# Patient Record
Sex: FEMALE | Race: WHITE | NOT HISPANIC OR LATINO
[De-identification: names, ages, dates, MRNs, and addresses within clinical notes are randomized per-mention and may not be internally consistent; named-entity substitution may affect disease eponyms.]

---

## 2019-03-22 ENCOUNTER — RESULT REVIEW (OUTPATIENT)
Age: 39
End: 2019-03-22

## 2019-11-19 PROBLEM — Z00.00 ENCOUNTER FOR PREVENTIVE HEALTH EXAMINATION: Status: ACTIVE | Noted: 2019-11-19

## 2019-11-21 ENCOUNTER — APPOINTMENT (OUTPATIENT)
Dept: CARDIOLOGY | Facility: CLINIC | Age: 39
End: 2019-11-21

## 2021-06-08 ENCOUNTER — APPOINTMENT (OUTPATIENT)
Dept: SURGERY | Facility: CLINIC | Age: 41
End: 2021-06-08
Payer: COMMERCIAL

## 2021-06-08 VITALS — BODY MASS INDEX: 39.27 KG/M2 | HEIGHT: 60 IN | WEIGHT: 200 LBS

## 2021-06-08 PROCEDURE — 99072 ADDL SUPL MATRL&STAF TM PHE: CPT

## 2021-06-08 PROCEDURE — 99204 OFFICE O/P NEW MOD 45 MIN: CPT

## 2021-06-08 RX ORDER — ETONOGESTREL AND ETHINYL ESTRADIOL .12; .015 MG/D; MG/D
0.12-0.015 INSERT, EXTENDED RELEASE VAGINAL
Refills: 0 | Status: ACTIVE | COMMUNITY

## 2021-06-08 NOTE — HISTORY OF PRESENT ILLNESS
[de-identified] : The patient is referred by Dr Anderson for evaluation and discussion regarding chronic biliary colic with gallstones and more recent intensification of symptoms. She has a 20 year h/o biliary colic for which she has mostly tolerated by avoiding fattyt greasy foods or by tolerating the pains subsequent to such meals. Recently the episodes have been more intense., lasting longer. \par She denies change in urine or stool color. She had a C/S and no other abdominal surgery. She is overweight.

## 2021-06-08 NOTE — CONSULT LETTER
[Dear  ___] : Dear  [unfilled], [Consult Letter:] : I had the pleasure of evaluating your patient, [unfilled]. [Please see my note below.] : Please see my note below. [FreeTextEntry1] : Luis Eduardo Harris- not sure what she is waiting for--long time with symptoms! She will schedule the surgery soon. Thanks--Cristobal [DrDianna  ___] : Dr. HURTADO

## 2021-06-08 NOTE — ASSESSMENT
[FreeTextEntry1] : biliary colic, crescendoing of symptoms, gallstones, probable chronic cholecystitis, \par discussed options, lap cholecystectomy recommended\par \par The risks benefits and alternatives were discussed and the patient agrees to the described plan.\par

## 2021-06-08 NOTE — PHYSICAL EXAM
[Normal Breath Sounds] : Normal breath sounds [Normal Heart Sounds] : normal heart sounds [No Rash or Lesion] : No rash or lesion [Alert] : alert [Oriented to Person] : oriented to person [Oriented to Place] : oriented to place [Oriented to Time] : oriented to time [Calm] : calm [de-identified] : NAD [de-identified] : benign, obese abd

## 2021-07-16 ENCOUNTER — APPOINTMENT (OUTPATIENT)
Dept: SURGERY | Facility: HOSPITAL | Age: 41
End: 2021-07-16

## 2021-07-23 ENCOUNTER — NON-APPOINTMENT (OUTPATIENT)
Age: 41
End: 2021-07-23

## 2021-07-25 ENCOUNTER — NON-APPOINTMENT (OUTPATIENT)
Age: 41
End: 2021-07-25

## 2021-07-29 ENCOUNTER — APPOINTMENT (OUTPATIENT)
Dept: SURGERY | Facility: CLINIC | Age: 41
End: 2021-07-29
Payer: COMMERCIAL

## 2021-07-29 VITALS
SYSTOLIC BLOOD PRESSURE: 134 MMHG | HEART RATE: 78 BPM | TEMPERATURE: 97.5 F | DIASTOLIC BLOOD PRESSURE: 78 MMHG | HEIGHT: 60 IN | WEIGHT: 200 LBS | BODY MASS INDEX: 39.27 KG/M2

## 2021-07-29 DIAGNOSIS — Z09 ENCOUNTER FOR FOLLOW-UP EXAMINATION AFTER COMPLETED TREATMENT FOR CONDITIONS OTHER THAN MALIGNANT NEOPLASM: ICD-10-CM

## 2021-07-29 PROCEDURE — 99024 POSTOP FOLLOW-UP VISIT: CPT

## 2021-12-13 ENCOUNTER — TRANSCRIPTION ENCOUNTER (OUTPATIENT)
Age: 41
End: 2021-12-13

## 2022-06-29 ENCOUNTER — APPOINTMENT (OUTPATIENT)
Dept: BARIATRICS/WEIGHT MGMT | Facility: CLINIC | Age: 42
End: 2022-06-29

## 2022-06-29 VITALS
HEART RATE: 90 BPM | WEIGHT: 194 LBS | HEIGHT: 60 IN | BODY MASS INDEX: 38.09 KG/M2 | SYSTOLIC BLOOD PRESSURE: 128 MMHG | DIASTOLIC BLOOD PRESSURE: 84 MMHG | OXYGEN SATURATION: 98 %

## 2022-06-29 DIAGNOSIS — Z80.49 FAMILY HISTORY OF MALIGNANT NEOPLASM OF OTHER GENITAL ORGANS: ICD-10-CM

## 2022-06-29 DIAGNOSIS — Z87.09 PERSONAL HISTORY OF OTHER DISEASES OF THE RESPIRATORY SYSTEM: ICD-10-CM

## 2022-06-29 DIAGNOSIS — Z80.41 FAMILY HISTORY OF MALIGNANT NEOPLASM OF OVARY: ICD-10-CM

## 2022-06-29 DIAGNOSIS — Z78.9 OTHER SPECIFIED HEALTH STATUS: ICD-10-CM

## 2022-06-29 DIAGNOSIS — Z87.19 PERSONAL HISTORY OF OTHER DISEASES OF THE DIGESTIVE SYSTEM: ICD-10-CM

## 2022-06-29 DIAGNOSIS — Z80.0 FAMILY HISTORY OF MALIGNANT NEOPLASM OF DIGESTIVE ORGANS: ICD-10-CM

## 2022-06-29 PROCEDURE — 99205 OFFICE O/P NEW HI 60 MIN: CPT

## 2022-06-29 RX ORDER — DULAGLUTIDE 1.5 MG/.5ML
1.5 INJECTION, SOLUTION SUBCUTANEOUS
Refills: 0 | Status: DISCONTINUED | COMMUNITY
End: 2022-06-29

## 2022-06-29 RX ORDER — FLUTICASONE PROPIONATE 50 UG/1
50 SPRAY, METERED NASAL
Qty: 48 | Refills: 0 | Status: COMPLETED | COMMUNITY
Start: 2022-06-22

## 2022-06-29 RX ORDER — OLOPATADINE HYDROCHLORIDE 665 UG/1
0.6 SPRAY, METERED NASAL
Qty: 92 | Refills: 0 | Status: COMPLETED | COMMUNITY
Start: 2022-06-22

## 2022-06-29 RX ORDER — CEFDINIR 300 MG/1
300 CAPSULE ORAL
Qty: 20 | Refills: 0 | Status: COMPLETED | COMMUNITY
Start: 2022-01-28

## 2022-06-29 RX ORDER — PREDNISONE 10 MG/1
10 TABLET ORAL
Qty: 15 | Refills: 0 | Status: COMPLETED | COMMUNITY
Start: 2022-06-22

## 2022-06-29 RX ORDER — AMOXICILLIN AND CLAVULANATE POTASSIUM 875; 125 MG/1; MG/1
875-125 TABLET, COATED ORAL
Qty: 14 | Refills: 0 | Status: DISCONTINUED | COMMUNITY
Start: 2022-05-13

## 2022-06-29 RX ORDER — PANTOPRAZOLE 40 MG/1
40 TABLET, DELAYED RELEASE ORAL
Qty: 90 | Refills: 0 | Status: ACTIVE | COMMUNITY
Start: 2021-07-21

## 2022-06-29 RX ORDER — NIRMATRELVIR AND RITONAVIR 300-100 MG
20 X 150 MG & KIT ORAL
Qty: 30 | Refills: 0 | Status: DISCONTINUED | COMMUNITY
Start: 2022-04-17

## 2022-06-29 RX ORDER — AMLODIPINE BESYLATE 2.5 MG/1
2.5 TABLET ORAL
Qty: 30 | Refills: 0 | Status: COMPLETED | COMMUNITY
Start: 2022-01-19

## 2022-06-30 PROBLEM — Z78.9 NEVER USED TOBACCO: Status: ACTIVE | Noted: 2022-06-30

## 2022-06-30 NOTE — ASSESSMENT
[FreeTextEntry1] : 42F PMH class II obesity, GERD, HTN, cholelithiasis s/p laparoscopic cholecystectomy, who presents to weight management for initial evaluation.\par \par # Class II obesity c/b HTN, GERD, hx lap-pedro: Weight today 194 lbs, BMI 37.89. She has always struggled with her weight, particularly after pregnancy. Has tried to manage weight with Weight Watchers and physical activity but has not maintained. Dietary habits are notable for high sat-fat / calorie dense snacks, diet beverages, take-out. She is sedentary. No RONEL per last sleep study although she wears a dental appliance for her snoring.\par - Recommend starting a food log\par - Aim to stick to meals, minimize snacks.\par - Encourage diet of whole, unprocessed foods. Plant-forward. \par - Dietician referral\par - Discussed medical treatment, prescribed.\par - Obtain lab records\par - F/u in ~4 weeks.

## 2022-06-30 NOTE — HISTORY OF PRESENT ILLNESS
[FreeTextEntry1] : 42F PMH class II obesity, GERD, HTN, cholelithiasis s/p laparoscopic cholecystectomy, who presents to weight management for initial evaluation.\par \par Weight/Diet History:\par States she was "always a chubby kid" and never had a "great diet" (ie grab a "roll with butter" for breakfast).\par Gained most of her weight since she had her daughter. Reports 160 lbs before pregnancy, 190 lbs during pregnancy, lost most of it post-pregnancy, but regained over the past 8 years.\par She has done Weight Watchers numerous times over the years, sometimes <6 months at a time, has not lost weight on it over the past 8 years. \par Otherwise, has tried to lose weight by incorporating walking/exercise. \par She did try lower doses of Trulicity for weight loss for a brief period of time and notes weight loss. \par \par Weight today: 194 lbs, BMI 37.89\par \par Diet: Eats 3 meals per day.\par B 8 am:\par L:\par D: 6-7 pm. \par Dessert:\par Snack: Cheese, chips, crackers. Usually beween breakfast and lunch\par Beverages: tea, water, diet soda\par Night-time eating: Minimal\par Binging: No\par Fast-food/Restaurants: Take-out a few times per week (pizza, pasta, Mexican)\par Cook/Prepare meals: Hello Fresh. She bakes,  cooks \par Added oils:\par Food Journal: On weight watchers.\par \par Exercise: No formal exercise currently, sitting at desk most of day.\par \par Sleep: Takes a long time to fall asleep. Goes to bed 10-11 pm. Snores a lot more since gaining weight. Has had 2 sleep studies, last was ~2 years ago, no overt apnea at that time. Wears dental appliance made at her office which helps her snoring\par \par Social Hx: Has 8 year old child. Works in a dentist office, billing.

## 2022-07-06 ENCOUNTER — TRANSCRIPTION ENCOUNTER (OUTPATIENT)
Age: 42
End: 2022-07-06

## 2022-08-15 ENCOUNTER — APPOINTMENT (OUTPATIENT)
Dept: BARIATRICS/WEIGHT MGMT | Facility: CLINIC | Age: 42
End: 2022-08-15

## 2022-08-15 VITALS — BODY MASS INDEX: 36.71 KG/M2 | HEIGHT: 60 IN | WEIGHT: 187 LBS

## 2022-08-15 PROCEDURE — 99214 OFFICE O/P EST MOD 30 MIN: CPT | Mod: 95

## 2022-08-15 RX ORDER — LIRAGLUTIDE 6 MG/ML
18 INJECTION, SOLUTION SUBCUTANEOUS
Qty: 1 | Refills: 1 | Status: DISCONTINUED | COMMUNITY
Start: 2022-07-11 | End: 2022-08-15

## 2022-08-15 RX ORDER — SEMAGLUTIDE 0.25 MG/.5ML
0.25 INJECTION, SOLUTION SUBCUTANEOUS
Qty: 1 | Refills: 0 | Status: DISCONTINUED | COMMUNITY
Start: 2022-07-11 | End: 2022-08-15

## 2022-08-15 RX ORDER — DULAGLUTIDE 0.75 MG/.5ML
0.75 INJECTION, SOLUTION SUBCUTANEOUS
Qty: 4 | Refills: 1 | Status: DISCONTINUED | COMMUNITY
Start: 2022-07-11 | End: 2022-08-15

## 2022-08-16 NOTE — PHYSICAL EXAM
[Obese, well nourished, in no acute distress] : obese, well nourished, in no acute distress [Normal] : affect appropriate [de-identified] : TEB appointment

## 2022-08-16 NOTE — ASSESSMENT
[FreeTextEntry1] : 42F PMH class II obesity, GERD, HTN, cholelithiasis s/p laparoscopic cholecystectomy, who presents to weight management for follow-up.\par \par # Class II obesity c/b HTN, GERD, hx lap-pedro: Weight today 187 lbs, BMI 36.52, she has lost 7 lbs since her initial appointment 6 weeks ago. She has been on Ozempic 0.25 mg/wk and tolerating well.  Noting some improved dietary habits and increase in physical activity. \par - Food log\par - Continue to minimize snacks (ie cheese and crackers) and stick to meals\par - Encourage diet of whole, unprocessed foods. Plant-forward. \par - Dietician referral\par - Increase Ozempic to 0.5 mg/wk\par - Obtain lab records\par - C/w regular physical activity, plan to walk on track\par - F/u in 4-6 weeks.

## 2022-08-16 NOTE — HISTORY OF PRESENT ILLNESS
[Home] : at home, [unfilled] , at the time of the visit. [Medical Office: ([facility])___] : at the medical office located in  [Verbal consent obtained from patient] : the patient, [unfilled] [FreeTextEntry1] : 42F PMH class II obesity, GERD, HTN, cholelithiasis s/p laparoscopic cholecystectomy, who presents to weight management for follow-up.\par \par She initially presented 6/2022 reporting that she had always struggled with her weight, particularly after pregnancy, and tried to manage weight with Weight Watchers and physical activity but was unable to maintain her weight loss. Dietary habits were notable for high sat-fat / calorie dense snacks, diet beverages, take-out. She is sedentary. No RONEL per last sleep study although she wears a dental appliance for her snoring.\par We discussed tracking her diet, eating at meals and minimizing snacks, focus on plant-forward whole foods, obtain labs and she was provided a dietician referral. \par She was prescribed semaglutide.\par \par Weight today: 187 lbs, BMI 36.52\par Weight at Initial Evaluation (6/29/22): 194 lbs, BMI 37.89\par \par Medication: She has been on Ozempic .25 mg/wk, decreased appetite. Has had nausea the day after a dose but not as bad if she doesn't eat as much that day.\par \par Diet: Sometimes doesn't feel like eating as much, skipping some meals (usually breakfast), goes for fruit and veg instead of cheese/crackers.\par \par Exercise: Walked a lot this week at [employer], also is using her Pelaton at home.

## 2022-09-16 ENCOUNTER — TRANSCRIPTION ENCOUNTER (OUTPATIENT)
Age: 42
End: 2022-09-16

## 2022-10-07 ENCOUNTER — APPOINTMENT (OUTPATIENT)
Dept: INTERNAL MEDICINE | Facility: CLINIC | Age: 42
End: 2022-10-07

## 2022-10-24 ENCOUNTER — APPOINTMENT (OUTPATIENT)
Dept: BARIATRICS/WEIGHT MGMT | Facility: CLINIC | Age: 42
End: 2022-10-24

## 2022-10-24 VITALS — HEIGHT: 60 IN | WEIGHT: 177 LBS | BODY MASS INDEX: 34.75 KG/M2

## 2022-10-24 DIAGNOSIS — K80.20 CALCULUS OF GALLBLADDER W/OUT CHOLECYSTITIS W/OUT OBSTRUCTION: ICD-10-CM

## 2022-10-24 DIAGNOSIS — K59.00 CONSTIPATION, UNSPECIFIED: ICD-10-CM

## 2022-10-24 PROCEDURE — 99214 OFFICE O/P EST MOD 30 MIN: CPT | Mod: 95

## 2022-10-24 NOTE — ASSESSMENT
[FreeTextEntry1] : 42F PMH class II obesity, GERD, HTN, cholelithiasis s/p laparoscopic cholecystectomy, who presents to weight management for follow-up.\par \par # Class II obesity c/b HTN, GERD, hx lap-pedro: Weight today 177 lbs, BMI 34.57, has lost 10 lbs since last visit 10 weeks ago, and 17 lbs since initial visit. She has maintained use of Ozempic .5 mg/wk, side effect of some constipation. Has made dietary improvements such as eliminating snacking and maintains intake of healthy foods. She is walking for exercise. Has trouble getting to bed early enough sometimes for exercise.\par - Food log\par - Continue to minimize snacks (ie cheese and crackers) and stick to meals\par - Encourage diet of whole, unprocessed foods. Plant-forward. \par - Increase Ozempic to 1.0 mg/wk\par - Obtain lab records\par - C/w regular walking, goal to use Pelaton 1x/wk\par - Goal to maintain regular/earlier bedtime\par - F/u in 4-8 weeks.\par \par # Constipation: \par - Maintain adequate dietary fiber intake, consider plant-based sources of proteins (ie beans, lentils, chickpeas).\par - Maintain adequate water intake \par - Exercise\par - Consider fiber supplement if intake not adequate\par - May escalate to add Mg-oxide\par - May escalate to add Miralax.

## 2022-10-24 NOTE — PHYSICAL EXAM
[Obese, well nourished, in no acute distress] : obese, well nourished, in no acute distress [de-identified] : JAYNE

## 2022-10-24 NOTE — HISTORY OF PRESENT ILLNESS
[Home] : at home, [unfilled] , at the time of the visit. [Medical Office: ([facility])___] : at the medical office located in  [Verbal consent obtained from patient] : the patient, [unfilled] [FreeTextEntry1] : 42F PMH class II obesity, GERD, HTN, cholelithiasis s/p laparoscopic cholecystectomy, who presents to weight management for follow-up.\par \par She initially presented 6/2022 reporting that she had always struggled with her weight, particularly after pregnancy, and tried to manage weight with Weight Watchers and physical activity but was unable to maintain her weight loss. Dietary habits were notable for high sat-fat / calorie dense snacks, diet beverages, take-out. She is sedentary. No RONEL per last sleep study although she wears a dental appliance for her snoring.\par We discussed tracking her diet, eating at meals and minimizing snacks, focus on plant-forward whole foods, obtain labs and she was provided a dietician referral. \par She was prescribed semaglutide.\par \par Weight today: 177 lbs, BMI 34.57\par Weight at last Visit (8/15/22): 187 lbs, BMI 36.52\par Weight at Initial Evaluation (6/29/22): 194 lbs, BMI 37.89\par \par Medication: She remains on Ozempic 0.5 mg/wk, some constipation noted, no other side effects. Estimates ~3 bowel movements per week. Was going frequently previously ever since she had her cholecystectomy.\par \par Diet: Apple + peanut butter for breakfast, chicken for lunch. Biggest meal still tends to be at night. Has eliminated a lot snacking. Dinner is usually chicken and/or pasta, often Hello Fresh.\par \par Exercise: Walking, using Pelaton on occasion.\par \par Focus on getting to sleep earlier.

## 2023-02-13 ENCOUNTER — APPOINTMENT (OUTPATIENT)
Dept: BARIATRICS/WEIGHT MGMT | Facility: CLINIC | Age: 43
End: 2023-02-13
Payer: COMMERCIAL

## 2023-02-13 VITALS — WEIGHT: 169 LBS | HEIGHT: 60 IN | BODY MASS INDEX: 33.18 KG/M2

## 2023-02-13 PROCEDURE — 99214 OFFICE O/P EST MOD 30 MIN: CPT | Mod: 95

## 2023-02-13 NOTE — ASSESSMENT
[FreeTextEntry1] : 42F PMH class II obesity, GERD, HTN, cholelithiasis s/p laparoscopic cholecystectomy, who presents to weight management for follow-up.\par \par # Class II obesity c/b HTN, GERD, hx lap-pedro: Weight today 169 lbs, BMI 33.01. Tolerating Ozempic 1.0 mg/wk well, constipation has improved, therapeutic effect lessened. Exercise limited. \par - Food log\par - Continue to minimize snacks (ie cheese and crackers) and stick to meals\par - Encourage diet of whole, unprocessed foods. Plant-forward. \par - Increase Ozempic to 2.0 mg/wk\par - Obtain lab records\par - C/w regular walking, goal to use Pelaton 1x/wk\par - Goal to maintain regular/earlier bedtime\par - F/u in ~2 months

## 2023-02-13 NOTE — HISTORY OF PRESENT ILLNESS
[Home] : at home, [unfilled] , at the time of the visit. [Medical Office: ([facility])___] : at the medical office located in  [Verbal consent obtained from patient] : the patient, [unfilled] [FreeTextEntry1] : 42F PMH class II obesity, GERD, HTN, cholelithiasis s/p laparoscopic cholecystectomy, who presents to weight management for follow-up.\par \par She initially presented 6/2022 reporting that she had always struggled with her weight, particularly after pregnancy, and tried to manage weight with Weight Watchers and physical activity but was unable to maintain her weight loss. Dietary habits were notable for high sat-fat / calorie dense snacks, diet beverages, take-out. She is sedentary. No RONEL per last sleep study although she wears a dental appliance for her snoring.\par We discussed tracking her diet, eating at meals and minimizing snacks, focus on plant-forward whole foods, obtain labs and she was provided a dietician referral. \par She was prescribed semaglutide.\par \par Weight today: 169 lbs, BMI 33.01\par Weight at last Visit (10/24/22): 177 lbs, BMI 34.57\par Weight at Initial Evaluation (6/29/22): 194 lbs, BMI 37.89\par \par Medication: She has been on Ozempic 1.0 mg/wk, tolerating without side effects, constipation improved.\par Has been off of it the past few weeks.\par \par Diet: struggling a bit more with diet, portion control, cravings.\par \par Exercise: Walking, using Pelaton on occasion, 1-2x/wk

## 2023-02-13 NOTE — PHYSICAL EXAM
[Obese, well nourished, in no acute distress] : obese, well nourished, in no acute distress [de-identified] : TEB appointment

## 2023-02-15 ENCOUNTER — APPOINTMENT (OUTPATIENT)
Dept: BARIATRICS/WEIGHT MGMT | Facility: CLINIC | Age: 43
End: 2023-02-15

## 2023-06-23 ENCOUNTER — APPOINTMENT (OUTPATIENT)
Dept: BARIATRICS/WEIGHT MGMT | Facility: CLINIC | Age: 43
End: 2023-06-23
Payer: COMMERCIAL

## 2023-06-23 VITALS — BODY MASS INDEX: 30.63 KG/M2 | HEIGHT: 60 IN | WEIGHT: 156 LBS

## 2023-06-23 PROCEDURE — 99214 OFFICE O/P EST MOD 30 MIN: CPT | Mod: 95

## 2023-06-23 RX ORDER — SEMAGLUTIDE 2.68 MG/ML
8 INJECTION, SOLUTION SUBCUTANEOUS
Qty: 9 | Refills: 0 | Status: ACTIVE | COMMUNITY
Start: 2022-06-29 | End: 1900-01-01

## 2023-06-26 NOTE — PHYSICAL EXAM
[Obese, well nourished, in no acute distress] : obese, well nourished, in no acute distress [de-identified] : TEB visit

## 2023-06-26 NOTE — HISTORY OF PRESENT ILLNESS
[Home] : at home, [unfilled] , at the time of the visit. [Medical Office: ([facility])___] : at the medical office located in  [Verbal consent obtained from patient] : the patient, [unfilled] [FreeTextEntry1] : 43F PMH class II obesity, GERD, HTN, cholelithiasis s/p laparoscopic cholecystectomy, who presents to weight management for follow-up.\par \par She initially presented 6/2022 reporting that she had always struggled with her weight, particularly after pregnancy, and tried to manage weight with Weight Watchers and physical activity but was unable to maintain her weight loss. Dietary habits were notable for high sat-fat / calorie dense snacks, diet beverages, take-out. She is sedentary. No RONEL per last sleep study although she wears a dental appliance for her snoring.\par We discussed tracking her diet, eating at meals and minimizing snacks, focus on plant-forward whole foods, obtain labs and she was provided a dietician referral. \par She was prescribed semaglutide.\par \par Weight today: 156 lbs, BMI 30.47\par Weight at last Visit (2/13/23): 169 lbs, BMI 33.01\par Weight at Initial Evaluation (6/29/22): 194 lbs, BMI 37.89\par \par Medication: She has been on Ozempic 2.0 mg/wk, no side effects.\par \par Diet: struggling a bit more with diet, portion control, cravings.\par \par Exercise: Walking, using Pelaton on occasion, 1-2x/wk \par \par Labs: \par 5/19/22 - CBC wnl, CMP wnl, TSH wnl, A1c 5.2%, Tchol 239, Trig 184, HDL 56, .\par 11/22/22 - B12 750

## 2023-06-26 NOTE — ASSESSMENT
[FreeTextEntry1] : 43F PMH class II obesity, GERD, HTN, cholelithiasis s/p laparoscopic cholecystectomy, who presents to weight management for follow-up.\par \par # Class II obesity c/b HTN, GERD, hx lap-pedro: Weight today 156 lbs, BMI 30.47, has lost 13 lbs since visit 4 months ago, and 28 lbs over the past year. Doing well on Ozempic 2.0 mg/wk, no side effects, struggling a bit more with appetite, gets regular activity. \par - Food log\par - Continue to minimize snacks (ie cheese and crackers) and stick to meals\par - Encourage diet of whole, unprocessed foods. Plant-forward. \par - C/w Ozempic to 2.0 mg/wk\par - Obtain lab records\par - C/w regular walking, goal to use Pelaton 1x/wk, or increase walks using podcasts\par - Goal to maintain regular/earlier bedtime\par - F/u in ~2 months.

## 2023-06-28 RX ORDER — SULFAMETHOXAZOLE AND TRIMETHOPRIM 800; 160 MG/1; MG/1
800-160 TABLET ORAL
Qty: 28 | Refills: 0 | Status: DISCONTINUED | COMMUNITY
Start: 2022-06-22 | End: 2023-06-28

## 2023-06-28 RX ORDER — VALSARTAN AND HYDROCHLOROTHIAZIDE 80; 12.5 MG/1; MG/1
80-12.5 TABLET, FILM COATED ORAL
Refills: 0 | Status: ACTIVE | COMMUNITY
Start: 2022-06-26

## 2023-06-28 RX ORDER — AMLODIPINE BESYLATE 5 MG/1
5 TABLET ORAL
Refills: 0 | Status: ACTIVE | COMMUNITY
Start: 2022-06-19

## 2023-08-22 ENCOUNTER — APPOINTMENT (OUTPATIENT)
Dept: PSYCHIATRY | Facility: CLINIC | Age: 43
End: 2023-08-22
Payer: COMMERCIAL

## 2023-08-22 PROCEDURE — 90791 PSYCH DIAGNOSTIC EVALUATION: CPT | Mod: 95

## 2023-09-14 ENCOUNTER — TRANSCRIPTION ENCOUNTER (OUTPATIENT)
Age: 43
End: 2023-09-14

## 2023-09-14 PROBLEM — I10 BENIGN ESSENTIAL HTN: Status: ACTIVE | Noted: 2023-06-28

## 2023-09-14 PROBLEM — E88.81 INSULIN RESISTANCE: Status: ACTIVE | Noted: 2022-06-29

## 2023-09-14 PROBLEM — E66.9 CLASS II OBESITY: Status: ACTIVE | Noted: 2022-06-29

## 2023-09-14 RX ORDER — SEMAGLUTIDE 1.7 MG/.75ML
1.7 INJECTION, SOLUTION SUBCUTANEOUS
Qty: 1 | Refills: 2 | Status: ACTIVE | COMMUNITY
Start: 2023-09-14 | End: 1900-01-01

## 2023-09-15 ENCOUNTER — APPOINTMENT (OUTPATIENT)
Dept: BARIATRICS/WEIGHT MGMT | Facility: CLINIC | Age: 43
End: 2023-09-15
Payer: COMMERCIAL

## 2023-09-15 VITALS — HEIGHT: 60 IN | WEIGHT: 151 LBS | BODY MASS INDEX: 29.64 KG/M2

## 2023-09-15 DIAGNOSIS — E88.81 METABOLIC SYNDROME: ICD-10-CM

## 2023-09-15 DIAGNOSIS — E66.9 OBESITY, UNSPECIFIED: ICD-10-CM

## 2023-09-15 DIAGNOSIS — I10 ESSENTIAL (PRIMARY) HYPERTENSION: ICD-10-CM

## 2023-09-15 PROCEDURE — 99214 OFFICE O/P EST MOD 30 MIN: CPT | Mod: 95

## 2023-10-03 ENCOUNTER — APPOINTMENT (OUTPATIENT)
Dept: PSYCHIATRY | Facility: CLINIC | Age: 43
End: 2023-10-03
Payer: COMMERCIAL

## 2023-10-03 PROCEDURE — 96132 NRPSYC TST EVAL PHYS/QHP 1ST: CPT | Mod: 95

## 2023-10-03 PROCEDURE — 96133 NRPSYC TST EVAL PHYS/QHP EA: CPT | Mod: 95

## 2023-10-18 ENCOUNTER — APPOINTMENT (OUTPATIENT)
Dept: PSYCHIATRY | Facility: CLINIC | Age: 43
End: 2023-10-18
Payer: COMMERCIAL

## 2023-10-18 DIAGNOSIS — Z13.39 ENCOUNTER FOR SCREENING EXAM FOR OTHER MENTAL HEALTH AND BEHAVIORAL DISORDERS: ICD-10-CM

## 2023-10-18 PROCEDURE — 96137 PSYCL/NRPSYC TST PHY/QHP EA: CPT

## 2023-10-18 PROCEDURE — 96133 NRPSYC TST EVAL PHYS/QHP EA: CPT

## 2023-10-18 PROCEDURE — 96136 PSYCL/NRPSYC TST PHY/QHP 1ST: CPT

## 2023-10-18 PROCEDURE — 96132 NRPSYC TST EVAL PHYS/QHP 1ST: CPT

## 2023-10-24 PROBLEM — Z13.39 ATTENTION DEFICIT HYPERACTIVITY DISORDER EVALUATION: Status: ACTIVE | Noted: 2023-08-22

## 2023-11-08 ENCOUNTER — APPOINTMENT (OUTPATIENT)
Dept: PSYCHIATRY | Facility: CLINIC | Age: 43
End: 2023-11-08
Payer: COMMERCIAL

## 2023-11-08 ENCOUNTER — APPOINTMENT (OUTPATIENT)
Dept: PSYCHIATRY | Facility: CLINIC | Age: 43
End: 2023-11-08

## 2023-11-08 DIAGNOSIS — F90.2 ATTENTION-DEFICIT HYPERACTIVITY DISORDER, COMBINED TYPE: ICD-10-CM

## 2023-11-08 PROCEDURE — 90832 PSYTX W PT 30 MINUTES: CPT | Mod: 95

## 2023-11-30 ENCOUNTER — TRANSCRIPTION ENCOUNTER (OUTPATIENT)
Age: 43
End: 2023-11-30